# Patient Record
Sex: MALE | Race: WHITE | NOT HISPANIC OR LATINO | Employment: OTHER | URBAN - METROPOLITAN AREA
[De-identification: names, ages, dates, MRNs, and addresses within clinical notes are randomized per-mention and may not be internally consistent; named-entity substitution may affect disease eponyms.]

---

## 2019-05-30 PROBLEM — M43.16 SPONDYLOLISTHESIS OF LUMBAR REGION: Status: ACTIVE | Noted: 2019-05-30

## 2019-07-08 PROBLEM — Z98.1 S/P LUMBAR SPINAL FUSION: Status: ACTIVE | Noted: 2019-07-08

## 2019-12-11 PROBLEM — I10 HYPERTENSION: Status: ACTIVE | Noted: 2019-12-11

## 2019-12-11 PROBLEM — F41.1 GENERALIZED ANXIETY DISORDER: Status: ACTIVE | Noted: 2017-01-19

## 2019-12-11 PROBLEM — E78.5 DYSLIPIDEMIA: Status: ACTIVE | Noted: 2018-11-07

## 2019-12-11 PROBLEM — I25.2 HISTORY OF MYOCARDIAL INFARCTION: Status: ACTIVE | Noted: 2017-01-19

## 2019-12-11 PROBLEM — I21.19 INFERIOR MI: Status: ACTIVE | Noted: 2019-12-11

## 2019-12-11 PROBLEM — E78.5 HYPERLIPIDEMIA: Status: ACTIVE | Noted: 2019-12-11

## 2019-12-11 PROBLEM — Z95.1 S/P CABG X 3: Status: ACTIVE | Noted: 2019-12-11

## 2019-12-11 PROBLEM — E78.2 MIXED HYPERLIPIDEMIA: Status: ACTIVE | Noted: 2019-12-11

## 2019-12-11 PROBLEM — I25.5 ISCHEMIC CARDIOMYOPATHY: Status: ACTIVE | Noted: 2019-12-11

## 2020-01-10 PROBLEM — N53.14 RETROGRADE EJACULATION: Status: ACTIVE | Noted: 2017-10-05

## 2020-01-10 PROBLEM — M54.42 CHRONIC MIDLINE LOW BACK PAIN WITH BILATERAL SCIATICA: Status: ACTIVE | Noted: 2019-03-05

## 2020-01-10 PROBLEM — M54.41 CHRONIC MIDLINE LOW BACK PAIN WITH BILATERAL SCIATICA: Status: ACTIVE | Noted: 2019-03-05

## 2020-01-10 PROBLEM — F40.10 SOCIAL PHOBIA: Status: ACTIVE | Noted: 2018-11-07

## 2020-01-10 PROBLEM — G89.29 CHRONIC MIDLINE LOW BACK PAIN WITH BILATERAL SCIATICA: Status: ACTIVE | Noted: 2019-03-05

## 2020-01-10 PROBLEM — I21.19 INFERIOR MI: Status: RESOLVED | Noted: 2019-12-11 | Resolved: 2020-01-10

## 2020-04-07 PROBLEM — M54.41 CHRONIC MIDLINE LOW BACK PAIN WITH BILATERAL SCIATICA: Status: RESOLVED | Noted: 2019-03-05 | Resolved: 2020-04-07

## 2020-04-07 PROBLEM — M67.40 GANGLION CYST: Status: ACTIVE | Noted: 2020-04-07

## 2020-04-07 PROBLEM — G89.29 CHRONIC MIDLINE LOW BACK PAIN WITH BILATERAL SCIATICA: Status: RESOLVED | Noted: 2019-03-05 | Resolved: 2020-04-07

## 2020-04-07 PROBLEM — M54.42 CHRONIC MIDLINE LOW BACK PAIN WITH BILATERAL SCIATICA: Status: RESOLVED | Noted: 2019-03-05 | Resolved: 2020-04-07

## 2020-09-24 PROBLEM — Z12.11 SCREENING FOR MALIGNANT NEOPLASM OF COLON: Status: ACTIVE | Noted: 2020-09-24

## 2020-10-22 PROBLEM — Z98.890 STATUS POST COLONOSCOPY: Status: ACTIVE | Noted: 2020-10-22

## 2021-01-06 PROBLEM — E11.9 TYPE 2 DIABETES MELLITUS WITHOUT COMPLICATION, WITH LONG-TERM CURRENT USE OF INSULIN: Status: ACTIVE | Noted: 2021-01-06

## 2021-01-06 PROBLEM — Z79.4 TYPE 2 DIABETES MELLITUS WITHOUT COMPLICATION, WITH LONG-TERM CURRENT USE OF INSULIN: Status: ACTIVE | Noted: 2021-01-06

## 2021-05-12 ENCOUNTER — PATIENT MESSAGE (OUTPATIENT)
Dept: RESEARCH | Facility: HOSPITAL | Age: 51
End: 2021-05-12

## 2021-08-24 PROBLEM — M25.512 CHRONIC LEFT SHOULDER PAIN: Status: ACTIVE | Noted: 2021-08-24

## 2021-08-24 PROBLEM — M75.112 INCOMPLETE TEAR OF LEFT ROTATOR CUFF: Status: ACTIVE | Noted: 2021-08-24

## 2021-08-24 PROBLEM — M75.42 IMPINGEMENT SYNDROME OF LEFT SHOULDER: Status: ACTIVE | Noted: 2021-08-24

## 2021-08-24 PROBLEM — G89.29 CHRONIC LEFT SHOULDER PAIN: Status: ACTIVE | Noted: 2021-08-24

## 2021-10-15 PROBLEM — F12.90 MARIJUANA USE: Chronic | Status: ACTIVE | Noted: 2021-10-15

## 2021-10-18 PROBLEM — Z98.890 STATUS POST COLONOSCOPY: Status: RESOLVED | Noted: 2020-10-22 | Resolved: 2021-10-18

## 2021-10-18 PROBLEM — Z98.1 S/P LUMBAR SPINAL FUSION: Status: RESOLVED | Noted: 2019-07-08 | Resolved: 2021-10-18

## 2021-10-18 PROBLEM — Z12.11 SCREENING FOR MALIGNANT NEOPLASM OF COLON: Status: RESOLVED | Noted: 2020-09-24 | Resolved: 2021-10-18

## 2021-10-18 PROBLEM — Z98.890 STATUS POST SURGERY: Status: ACTIVE | Noted: 2021-10-18

## 2021-10-18 PROBLEM — M25.512 LEFT SHOULDER PAIN: Status: ACTIVE | Noted: 2021-10-18

## 2021-10-18 PROBLEM — E11.42 TYPE 2 DIABETES MELLITUS WITH DIABETIC POLYNEUROPATHY, WITH LONG-TERM CURRENT USE OF INSULIN: Status: ACTIVE | Noted: 2021-01-06

## 2021-10-18 PROBLEM — E66.812 OBESITY, CLASS II, BMI 35-39.9: Chronic | Status: ACTIVE | Noted: 2021-10-18

## 2021-10-18 PROBLEM — E66.9 OBESITY, CLASS II, BMI 35-39.9: Chronic | Status: ACTIVE | Noted: 2021-10-18

## 2021-10-18 PROBLEM — M43.16 SPONDYLOLISTHESIS OF LUMBAR REGION: Status: RESOLVED | Noted: 2019-05-30 | Resolved: 2021-10-18

## 2021-10-18 PROBLEM — N53.14 RETROGRADE EJACULATION: Status: RESOLVED | Noted: 2017-10-05 | Resolved: 2021-10-18

## 2021-10-18 PROBLEM — M19.012 ARTHROSIS OF LEFT ACROMIOCLAVICULAR JOINT: Status: ACTIVE | Noted: 2021-10-18

## 2022-03-09 PROBLEM — I95.9 HYPOTENSION: Status: ACTIVE | Noted: 2022-03-09

## 2022-07-27 ENCOUNTER — NURSE TRIAGE (OUTPATIENT)
Dept: ADMINISTRATIVE | Facility: CLINIC | Age: 52
End: 2022-07-27
Payer: MEDICARE

## 2022-07-27 NOTE — TELEPHONE ENCOUNTER
Unable to reach at number listed for callback.  left with 800# to callback if assistance needed.       Reason for Disposition   Message left on unidentified voice mail. Phone number verified.    Protocols used: NO CONTACT OR DUPLICATE CONTACT CALL-A-[place]

## 2022-11-15 PROBLEM — I50.30 HEART FAILURE WITH PRESERVED EJECTION FRACTION: Status: ACTIVE | Noted: 2022-11-15

## 2023-01-11 DIAGNOSIS — E11.9 TYPE 2 DIABETES MELLITUS WITHOUT COMPLICATION: ICD-10-CM

## 2023-01-13 ENCOUNTER — PATIENT MESSAGE (OUTPATIENT)
Dept: ADMINISTRATIVE | Facility: HOSPITAL | Age: 53
End: 2023-01-13
Payer: MEDICARE

## 2024-02-27 DIAGNOSIS — Z00.00 ENCOUNTER FOR MEDICARE ANNUAL WELLNESS EXAM: ICD-10-CM

## 2024-05-21 ENCOUNTER — PATIENT OUTREACH (OUTPATIENT)
Dept: ADMINISTRATIVE | Facility: OTHER | Age: 54
End: 2024-05-21
Payer: MEDICARE

## 2024-06-14 PROBLEM — H25.812 COMBINED FORMS OF AGE-RELATED CATARACT OF LEFT EYE: Status: ACTIVE | Noted: 2024-06-14

## 2024-07-22 ENCOUNTER — PATIENT OUTREACH (OUTPATIENT)
Dept: ADMINISTRATIVE | Facility: OTHER | Age: 54
End: 2024-07-22
Payer: MEDICARE

## 2024-07-22 NOTE — PROGRESS NOTES
CHW - Outreach Attempt    Community Health Worker left a voicemail message for 1st attempt to contact patient regarding: follow up phone interview.   Community Health Worker to attempt to contact patient on: 07/22/2024.

## 2024-09-19 ENCOUNTER — PATIENT OUTREACH (OUTPATIENT)
Dept: ADMINISTRATIVE | Facility: OTHER | Age: 54
End: 2024-09-19
Payer: MEDICARE

## 2024-09-19 NOTE — PROGRESS NOTES
CHW - Outreach Attempt    Community Health Worker left a voicemail message for 1st attempt to contact patient regarding: follow up phone interview.   Community Health Worker to attempt to contact patient on: 09/19/2024

## 2024-10-23 ENCOUNTER — PATIENT OUTREACH (OUTPATIENT)
Dept: ADMINISTRATIVE | Facility: OTHER | Age: 54
End: 2024-10-23
Payer: MEDICARE

## 2024-10-23 NOTE — PROGRESS NOTES
CHW - Outreach Attempt    Community Health Worker left a voicemail message for 2nd attempt to contact patient regarding: follow up phone interview.   Community Health Worker to attempt to contact patient on: 10/23/2024

## 2025-01-09 ENCOUNTER — PATIENT OUTREACH (OUTPATIENT)
Dept: ADMINISTRATIVE | Facility: OTHER | Age: 55
End: 2025-01-09
Payer: MEDICARE

## 2025-01-09 NOTE — PROGRESS NOTES
CHW - Follow Up    This Community Health Worker completed a follow up visit with patient via telephone today.  Pt reported: patient reported he is doing well no assistance is needed during follow up phone interview.  Patient will reach out if assistance is needed in the future. Patient was reminded of up coming appointments   Community Health Worker provided: patient reported he is doing well   Follow up required:No   Case Closure - Due: 1/9/2025               CHW - Case Closure    This Community Health Worker spoke to patient via telephone today.   Pt reported: patient reported he is doing well no assistance is needed during follow up phone interview.  Patient will reach out if assistance is needed in the future. Patient was reminded of up coming appointments   Pt denied any additional needs at this time and agrees with episode closure at this time.    Provided patient with Community Health Worker's contact information and encouraged   him to contact this Community Health Worker if additional needs arise.